# Patient Record
Sex: FEMALE | Race: WHITE | HISPANIC OR LATINO
[De-identification: names, ages, dates, MRNs, and addresses within clinical notes are randomized per-mention and may not be internally consistent; named-entity substitution may affect disease eponyms.]

---
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
## 2022-03-16 DIAGNOSIS — K21.9 GASTROESOPHAGEAL REFLUX DISEASE WITHOUT ESOPHAGITIS: ICD-10-CM

## 2022-03-16 RX ORDER — OMEPRAZOLE 40 MG/1
CAPSULE, DELAYED RELEASE ORAL
Qty: 90 CAPSULE | Refills: 0 | Status: SHIPPED | OUTPATIENT
Start: 2022-03-16

## 2022-04-15 NOTE — ED PROVIDER NOTES
EXAMINATION TYPE: XR chest 1V

 

DATE OF EXAM: 4/15/2022

 

COMPARISON: 4/14/2022

 

HISTORY: Shortness of breath

 

TECHNIQUE: Single frontal view of the chest is obtained.

 

FINDINGS:  Improving bilateral infiltrate and small effusion. Heart remains prominent. Atheroscleroti
c change aorta. No pneumothorax. Diffuse osteopenia. Hypertrophic and degenerative changes of the spi
ne.

 

IMPRESSION:  Improving bilateral lower lobe infiltrate and small effusion. Is worsened  Patient Seen in: [facility] Emergency Department      History   Patient presents with:  Abdomen/Flank Pain  Nausea/Vomiting/Diarrhea    Stated Complaint: Intermittent Abdl Pain & vomiting after eating, worsening recently - onset Feb * except as noted above.     Physical Exam     ED Triage Vitals [06/26/21 1937]   /89   Pulse 118   Resp 20   Temp 97.7 °F (36.5 °C)   Temp src    SpO2 99 %   O2 Device None (Room air)       Current:/89   Pulse 118   Temp 97.7 °F (36.5 °C)   Resp obturator sign. Comments: Mild epigastric and right upper quadrant tenderness. Musculoskeletal:         General: No tenderness. Normal range of motion. Cervical back: Normal range of motion and neck supple.    Lymphadenopathy:      Cervical: No (CST): Zackary Bradley MD on 6/26/2021 at 10:40 PM         Labs:  Personally reviewed all labs ordered.   Clinically significant lab abnormalities: normal    Medications administered:  Medications   sodium chloride 0.9% IV bolus 1,000 mL (1,000 mL Intravenous N may have been recognized inappropriately. If there are any questions or concerns, contact the dictating provider for clarification.                          Disposition and Plan     Clinical Impression:  Gastroesophageal reflux disease without esophagitis